# Patient Record
Sex: MALE | Race: OTHER | Employment: STUDENT | URBAN - METROPOLITAN AREA
[De-identification: names, ages, dates, MRNs, and addresses within clinical notes are randomized per-mention and may not be internally consistent; named-entity substitution may affect disease eponyms.]

---

## 2018-08-08 ENCOUNTER — NEW PATIENT COMPREHENSIVE (OUTPATIENT)
Dept: URBAN - METROPOLITAN AREA CLINIC 43 | Facility: CLINIC | Age: 23
End: 2018-08-08

## 2018-08-08 DIAGNOSIS — H44.23: ICD-10-CM

## 2018-08-08 PROCEDURE — 92004 COMPRE OPH EXAM NEW PT 1/>: CPT

## 2018-08-08 PROCEDURE — 92015 DETERMINE REFRACTIVE STATE: CPT

## 2018-08-08 PROCEDURE — 92310-1 LEVEL 1 CONTACT LENS MANAGEMENT

## 2018-08-08 ASSESSMENT — VISUAL ACUITY
OS_SC: J1
OS_CC: 20/30-1
OS_CC: J1
OS_SC: CF 4FT
OD_SC: CF 3FT
OD_CC: J1
OD_PH: 20/30-1
OD_SC: J1
OD_CC: 20/60

## 2018-08-08 ASSESSMENT — TONOMETRY
OS_IOP_MMHG: 18
OD_IOP_MMHG: 18

## 2018-09-14 ENCOUNTER — FOLLOW UP (OUTPATIENT)
Dept: URBAN - METROPOLITAN AREA CLINIC 43 | Facility: CLINIC | Age: 23
End: 2018-09-14

## 2018-09-14 DIAGNOSIS — H44.23: ICD-10-CM

## 2018-09-14 PROCEDURE — 92310F

## 2018-09-14 ASSESSMENT — VISUAL ACUITY
OD_CC: 20/40-1
OS_CC: 20/50

## 2019-08-09 ENCOUNTER — ESTABLISHED COMPREHENSIVE EXAM (OUTPATIENT)
Dept: URBAN - METROPOLITAN AREA CLINIC 43 | Facility: CLINIC | Age: 24
End: 2019-08-09

## 2019-08-09 DIAGNOSIS — H52.13: ICD-10-CM

## 2019-08-09 DIAGNOSIS — Z97.3: ICD-10-CM

## 2019-08-09 PROCEDURE — 92015 DETERMINE REFRACTIVE STATE: CPT

## 2019-08-09 PROCEDURE — 92014 COMPRE OPH EXAM EST PT 1/>: CPT

## 2019-08-09 PROCEDURE — 92310-1 LEVEL 1 CONTACT LENS MANAGEMENT

## 2019-08-09 ASSESSMENT — TONOMETRY
OD_IOP_MMHG: 18
OS_IOP_MMHG: 19

## 2019-08-09 ASSESSMENT — VISUAL ACUITY
OS_SC: J1 @ 6 IN
OS_SC: CF 6FT
OD_CC: 20/40-2
OD_SC: J1 @ 6IN
OD_CC: J1
OS_CC: J1
OD_SC: CF 6FT
OS_CC: 20/25

## 2020-09-24 ENCOUNTER — ESTABLISHED COMPREHENSIVE EXAM (OUTPATIENT)
Dept: URBAN - METROPOLITAN AREA CLINIC 43 | Facility: CLINIC | Age: 25
End: 2020-09-24

## 2020-09-24 DIAGNOSIS — H52.13: ICD-10-CM

## 2020-09-24 DIAGNOSIS — Z97.3: ICD-10-CM

## 2020-09-24 PROCEDURE — 92015 DETERMINE REFRACTIVE STATE: CPT

## 2020-09-24 PROCEDURE — 92014 COMPRE OPH EXAM EST PT 1/>: CPT

## 2020-09-24 PROCEDURE — 92310-1 LEVEL 1 CONTACT LENS MANAGEMENT

## 2020-09-24 ASSESSMENT — VISUAL ACUITY
OS_CC: 20/40
OD_CC: 20/50-1
OD_SC: CF 5FT
OS_CC: J1
OD_CC: J1
OS_PH: 20/30
OS_SC: CF 5FT
OD_SC: J12
OS_SC: J12
OD_PH: 20/30-2

## 2020-09-24 ASSESSMENT — TONOMETRY
OD_IOP_MMHG: 18
OS_IOP_MMHG: 18

## 2021-09-27 ENCOUNTER — ESTABLISHED COMPREHENSIVE EXAM (OUTPATIENT)
Dept: URBAN - METROPOLITAN AREA CLINIC 43 | Facility: CLINIC | Age: 26
End: 2021-09-27

## 2021-09-27 DIAGNOSIS — Z97.3: ICD-10-CM

## 2021-09-27 DIAGNOSIS — H52.13: ICD-10-CM

## 2021-09-27 PROCEDURE — 92310-1 LEVEL 1 CONTACT LENS MANAGEMENT

## 2021-09-27 PROCEDURE — 92015 DETERMINE REFRACTIVE STATE: CPT

## 2021-09-27 PROCEDURE — 92014 COMPRE OPH EXAM EST PT 1/>: CPT

## 2021-09-27 ASSESSMENT — VISUAL ACUITY
OD_SC: CF 5FT
OS_CC: J1
OD_CC: J1
OS_SC: 20/400
OD_SC: >J12
OS_SC: >J12

## 2021-09-27 ASSESSMENT — TONOMETRY
OD_IOP_MMHG: 20
OS_IOP_MMHG: 19

## 2022-10-11 NOTE — PATIENT DISCUSSION
Pt is ready to consider cataract surgery and wants consult in Jan 2023 with [facility]. Discussed due to amd that OD is  not good candidate for advanced, but possibly for OS to keep some reading.  Otherwise discussed custom dist or near.

## 2022-10-11 NOTE — PATIENT DISCUSSION
Patient understands condition, prognosis and need for follow up care. Universal Safety Interventions

## 2022-10-28 ENCOUNTER — COMPREHENSIVE EXAM (OUTPATIENT)
Dept: URBAN - METROPOLITAN AREA CLINIC 43 | Facility: CLINIC | Age: 27
End: 2022-10-28

## 2022-10-28 DIAGNOSIS — H52.13: ICD-10-CM

## 2022-10-28 DIAGNOSIS — Z97.3: ICD-10-CM

## 2022-10-28 PROCEDURE — 92014 COMPRE OPH EXAM EST PT 1/>: CPT

## 2022-10-28 PROCEDURE — 92015 DETERMINE REFRACTIVE STATE: CPT

## 2022-10-28 PROCEDURE — 92310-1 LEVEL 1 CONTACT LENS MANAGEMENT

## 2022-10-28 ASSESSMENT — VISUAL ACUITY
OS_SC: >J12
OS_SC: 5/200
OD_SC: 5/200
OD_SC: >J12
OS_CC: 20/20
OD_CC: 20/20

## 2022-10-28 ASSESSMENT — TONOMETRY
OS_IOP_MMHG: 16
OD_IOP_MMHG: 16

## 2023-06-06 NOTE — PATIENT DISCUSSION
Follow up with Dr. Gandhi in 10 days.   Keep wound dry for 48 hours then may shower.   No driving while taking prescription narcotics.   No lifting greater than 15 pounds.   Norco 5-325 mg prescribed for pain. 1-2 tablets by mouth every four hours as needed for pain  Call Dr. Gandhi with any questions or concerns.   Optional changes.

## 2023-12-11 ENCOUNTER — COMPREHENSIVE EXAM (OUTPATIENT)
Dept: URBAN - METROPOLITAN AREA CLINIC 43 | Facility: CLINIC | Age: 28
End: 2023-12-11

## 2023-12-11 DIAGNOSIS — H52.13: ICD-10-CM

## 2023-12-11 DIAGNOSIS — Z97.3: ICD-10-CM

## 2023-12-11 PROCEDURE — 92015 DETERMINE REFRACTIVE STATE: CPT

## 2023-12-11 PROCEDURE — 92310-1 LEVEL 1 CONTACT LENS MANAGEMENT

## 2023-12-11 PROCEDURE — 92014 COMPRE OPH EXAM EST PT 1/>: CPT

## 2023-12-11 ASSESSMENT — VISUAL ACUITY
OD_CC: J1
OS_CC: J1
OS_SC: CF 5FT
OD_CC: 20/20
OS_CC: 20/20
OD_SC: CF 5FT

## 2023-12-11 ASSESSMENT — TONOMETRY
OS_IOP_MMHG: 20
OD_IOP_MMHG: 20

## 2024-12-09 ENCOUNTER — COMPREHENSIVE EXAM (OUTPATIENT)
Age: 29
End: 2024-12-09

## 2024-12-09 DIAGNOSIS — H52.13: ICD-10-CM

## 2024-12-09 DIAGNOSIS — Z97.3: ICD-10-CM

## 2024-12-09 PROCEDURE — 92015 DETERMINE REFRACTIVE STATE: CPT

## 2024-12-09 PROCEDURE — 92014 COMPRE OPH EXAM EST PT 1/>: CPT

## 2024-12-09 PROCEDURE — 92310-1 LEVEL 1 SOFT LENS UPDATE

## 2024-12-09 PROCEDURE — 1036F TOBACCO NON-USER: CPT

## 2024-12-09 PROCEDURE — 92250E RETINAL SCREENING, ELECTIVE

## 2024-12-09 PROCEDURE — G8427 DOCREV CUR MEDS BY ELIG CLIN: HCPCS
